# Patient Record
Sex: FEMALE | Race: WHITE | NOT HISPANIC OR LATINO | Employment: OTHER | URBAN - METROPOLITAN AREA
[De-identification: names, ages, dates, MRNs, and addresses within clinical notes are randomized per-mention and may not be internally consistent; named-entity substitution may affect disease eponyms.]

---

## 2024-02-07 PROBLEM — I10 PRIMARY HYPERTENSION: Status: ACTIVE | Noted: 2024-02-07

## 2024-02-07 PROBLEM — E11.9 INSULIN DEPENDENT TYPE 2 DIABETES MELLITUS (MULTI): Status: ACTIVE | Noted: 2024-02-07

## 2024-02-07 PROBLEM — Z79.4 INSULIN DEPENDENT TYPE 2 DIABETES MELLITUS (MULTI): Status: ACTIVE | Noted: 2024-02-07

## 2024-02-07 PROBLEM — E55.9 VITAMIN D DEFICIENCY: Status: ACTIVE | Noted: 2024-02-07

## 2024-02-07 RX ORDER — FLUTICASONE PROPIONATE 50 MCG
1 SPRAY, SUSPENSION (ML) NASAL 2 TIMES DAILY
COMMUNITY
Start: 2023-02-09

## 2024-02-07 RX ORDER — AMLODIPINE BESYLATE 10 MG/1
10 TABLET ORAL DAILY
COMMUNITY
Start: 2023-12-13

## 2024-02-07 RX ORDER — BLOOD-GLUCOSE,RECEIVER,CONT
1 EACH MISCELLANEOUS AS NEEDED
COMMUNITY
Start: 2023-10-03 | End: 2024-02-16 | Stop reason: ALTCHOICE

## 2024-02-07 RX ORDER — METOPROLOL TARTRATE 25 MG/1
25 TABLET, FILM COATED ORAL 2 TIMES DAILY
COMMUNITY
Start: 2024-01-04 | End: 2024-02-16 | Stop reason: SDUPTHER

## 2024-02-07 RX ORDER — ASPIRIN 325 MG
50000 TABLET, DELAYED RELEASE (ENTERIC COATED) ORAL
COMMUNITY
Start: 2023-10-03

## 2024-02-07 RX ORDER — BLOOD-GLUCOSE SENSOR
1 EACH MISCELLANEOUS SEE ADMIN INSTRUCTIONS
COMMUNITY
Start: 2023-10-03 | End: 2024-02-16 | Stop reason: ALTCHOICE

## 2024-02-07 RX ORDER — PROMETHAZINE HYDROCHLORIDE AND DEXTROMETHORPHAN HYDROBROMIDE 6.25; 15 MG/5ML; MG/5ML
5 SYRUP ORAL 4 TIMES DAILY PRN
COMMUNITY
Start: 2023-02-10 | End: 2024-02-16 | Stop reason: ALTCHOICE

## 2024-02-07 RX ORDER — GABAPENTIN 600 MG/1
600 TABLET ORAL 3 TIMES DAILY
COMMUNITY
Start: 2023-12-29

## 2024-02-07 RX ORDER — FUROSEMIDE 20 MG/1
20 TABLET ORAL DAILY
COMMUNITY
Start: 2023-04-19 | End: 2024-02-16 | Stop reason: ALTCHOICE

## 2024-02-07 RX ORDER — MELOXICAM 15 MG/1
15 TABLET ORAL DAILY
COMMUNITY
Start: 2023-12-13

## 2024-02-07 RX ORDER — FEXOFENADINE HYDROCHLORIDE 180 MG/1
180 TABLET, FILM COATED ORAL DAILY
COMMUNITY
Start: 2023-04-19 | End: 2024-02-16 | Stop reason: ALTCHOICE

## 2024-02-07 RX ORDER — INSULIN GLARGINE 100 [IU]/ML
55 INJECTION, SOLUTION SUBCUTANEOUS DAILY
COMMUNITY
Start: 2023-12-08 | End: 2024-02-16 | Stop reason: SDUPTHER

## 2024-02-07 RX ORDER — SEMAGLUTIDE 0.68 MG/ML
0.5 INJECTION, SOLUTION SUBCUTANEOUS
COMMUNITY
Start: 2023-07-06 | End: 2024-02-16 | Stop reason: ALTCHOICE

## 2024-02-07 RX ORDER — FOLIC ACID 1 MG/1
1 TABLET ORAL DAILY
COMMUNITY
Start: 2023-10-03 | End: 2024-02-16 | Stop reason: ALTCHOICE

## 2024-02-07 RX ORDER — BLOOD-GLUCOSE TRANSMITTER
1 EACH MISCELLANEOUS
COMMUNITY
Start: 2023-12-14 | End: 2024-02-16 | Stop reason: ALTCHOICE

## 2024-02-08 ENCOUNTER — APPOINTMENT (OUTPATIENT)
Dept: PRIMARY CARE | Facility: CLINIC | Age: 59
End: 2024-02-08
Payer: COMMERCIAL

## 2024-02-16 ENCOUNTER — OFFICE VISIT (OUTPATIENT)
Dept: PRIMARY CARE | Facility: CLINIC | Age: 59
End: 2024-02-16
Payer: COMMERCIAL

## 2024-02-16 VITALS
RESPIRATION RATE: 16 BRPM | WEIGHT: 269 LBS | HEART RATE: 69 BPM | BODY MASS INDEX: 43.23 KG/M2 | HEIGHT: 66 IN | DIASTOLIC BLOOD PRESSURE: 62 MMHG | SYSTOLIC BLOOD PRESSURE: 110 MMHG | TEMPERATURE: 97.3 F | OXYGEN SATURATION: 98 %

## 2024-02-16 DIAGNOSIS — I25.10 CORONARY ARTERY DISEASE INVOLVING NATIVE CORONARY ARTERY OF NATIVE HEART WITHOUT ANGINA PECTORIS: ICD-10-CM

## 2024-02-16 DIAGNOSIS — E11.9 INSULIN DEPENDENT TYPE 2 DIABETES MELLITUS (MULTI): ICD-10-CM

## 2024-02-16 DIAGNOSIS — Z79.4 INSULIN DEPENDENT TYPE 2 DIABETES MELLITUS (MULTI): ICD-10-CM

## 2024-02-16 DIAGNOSIS — I10 PRIMARY HYPERTENSION: ICD-10-CM

## 2024-02-16 DIAGNOSIS — E66.01 OBESITY, MORBID (MULTI): Primary | ICD-10-CM

## 2024-02-16 LAB — POC HEMOGLOBIN A1C: 9.9 % (ref 4.2–6.5)

## 2024-02-16 PROCEDURE — 99204 OFFICE O/P NEW MOD 45 MIN: CPT | Performed by: FAMILY MEDICINE

## 2024-02-16 PROCEDURE — 3078F DIAST BP <80 MM HG: CPT | Performed by: FAMILY MEDICINE

## 2024-02-16 PROCEDURE — 83036 HEMOGLOBIN GLYCOSYLATED A1C: CPT | Mod: MUE | Performed by: FAMILY MEDICINE

## 2024-02-16 PROCEDURE — 99214 OFFICE O/P EST MOD 30 MIN: CPT | Performed by: FAMILY MEDICINE

## 2024-02-16 PROCEDURE — 1036F TOBACCO NON-USER: CPT | Performed by: FAMILY MEDICINE

## 2024-02-16 PROCEDURE — 3074F SYST BP LT 130 MM HG: CPT | Performed by: FAMILY MEDICINE

## 2024-02-16 RX ORDER — ROSUVASTATIN CALCIUM 20 MG/1
20 TABLET, COATED ORAL DAILY
Qty: 90 TABLET | Refills: 3 | Status: SHIPPED | OUTPATIENT
Start: 2024-02-16 | End: 2025-02-10

## 2024-02-16 ASSESSMENT — PATIENT HEALTH QUESTIONNAIRE - PHQ9
SUM OF ALL RESPONSES TO PHQ9 QUESTIONS 1 AND 2: 0
2. FEELING DOWN, DEPRESSED OR HOPELESS: NOT AT ALL
1. LITTLE INTEREST OR PLEASURE IN DOING THINGS: NOT AT ALL

## 2024-02-16 ASSESSMENT — ENCOUNTER SYMPTOMS
OCCASIONAL FEELINGS OF UNSTEADINESS: 1
CONSTITUTIONAL NEGATIVE: 1
GASTROINTESTINAL NEGATIVE: 1
MUSCULOSKELETAL NEGATIVE: 1
NEUROLOGICAL NEGATIVE: 1
CARDIOVASCULAR NEGATIVE: 1
RESPIRATORY NEGATIVE: 1
LOSS OF SENSATION IN FEET: 0
DEPRESSION: 0

## 2024-02-16 ASSESSMENT — COLUMBIA-SUICIDE SEVERITY RATING SCALE - C-SSRS
2. HAVE YOU ACTUALLY HAD ANY THOUGHTS OF KILLING YOURSELF?: NO
1. IN THE PAST MONTH, HAVE YOU WISHED YOU WERE DEAD OR WISHED YOU COULD GO TO SLEEP AND NOT WAKE UP?: NO
6. HAVE YOU EVER DONE ANYTHING, STARTED TO DO ANYTHING, OR PREPARED TO DO ANYTHING TO END YOUR LIFE?: NO

## 2024-02-16 ASSESSMENT — PAIN SCALES - GENERAL: PAINLEVEL: 0-NO PAIN

## 2024-02-16 NOTE — PROGRESS NOTES
"Subjective   Patient ID: Dee Collins is a 58 y.o. female who presents for New Patient Visit (Pt is here to establish care and for medication refills. Pt is diabetic and has not had a eye exam for 3 years. ).    HPI no eye exam in 3 years.  She took trulicity in the past but had significant side effects with palpitations.    She had stent placement about 7 years ago and last saw cardiology was at the time of the stent placement.   Review of Systems   Constitutional: Negative.    HENT: Negative.     Respiratory: Negative.     Cardiovascular: Negative.    Gastrointestinal: Negative.    Genitourinary: Negative.    Musculoskeletal: Negative.    Neurological: Negative.        Objective   /62 (BP Location: Left arm, Patient Position: Sitting, BP Cuff Size: Adult long)   Pulse 69   Temp 36.3 °C (97.3 °F) (Temporal)   Resp 16   Ht 1.676 m (5' 6\")   Wt 122 kg (269 lb)   SpO2 98%   BMI 43.42 kg/m²     Physical Exam  Constitutional:       General: She is not in acute distress.     Appearance: Normal appearance.   Cardiovascular:      Rate and Rhythm: Normal rate and regular rhythm.      Heart sounds: No murmur heard.  Pulmonary:      Breath sounds: Normal breath sounds. No wheezing.   Neurological:      Mental Status: She is alert.         Assessment/Plan   Problem List Items Addressed This Visit             ICD-10-CM    Insulin dependent type 2 diabetes mellitus (CMS/HCC) E11.9, Z79.4    Relevant Orders    POCT glycosylated hemoglobin (Hb A1C) manually resulted (Completed)    Albumin, urine, random    Referral to Ophthalmology    CBC and Auto Differential    Comprehensive Metabolic Panel    TSH with reflex to Free T4 if abnormal    Lipid Panel    Referral to Cardiology    Referral to Ophthalmology    Obesity, morbid (CMS/HCC) - Primary E66.01    Relevant Orders    CBC and Auto Differential    Comprehensive Metabolic Panel    TSH with reflex to Free T4 if abnormal    Lipid Panel     Other Visit Diagnoses         " Codes    Coronary artery disease involving native coronary artery of native heart without angina pectoris     I25.10    Relevant Medications    rosuvastatin (Crestor) 20 mg tablet          Will re establish meds and check labs.  Recheck 3 months.

## 2024-02-19 RX ORDER — INSULIN GLARGINE 100 [IU]/ML
55 INJECTION, SOLUTION SUBCUTANEOUS DAILY
Qty: 3 ML | Refills: 11 | Status: SHIPPED | OUTPATIENT
Start: 2024-02-19 | End: 2024-03-04 | Stop reason: SDUPTHER

## 2024-02-19 RX ORDER — METOPROLOL TARTRATE 25 MG/1
25 TABLET, FILM COATED ORAL 2 TIMES DAILY
Qty: 90 TABLET | Refills: 3 | Status: SHIPPED | OUTPATIENT
Start: 2024-02-19

## 2024-03-04 ENCOUNTER — CLINICAL SUPPORT (OUTPATIENT)
Dept: PRIMARY CARE | Facility: CLINIC | Age: 59
End: 2024-03-04
Payer: COMMERCIAL

## 2024-03-04 DIAGNOSIS — E11.9 INSULIN DEPENDENT TYPE 2 DIABETES MELLITUS (MULTI): ICD-10-CM

## 2024-03-04 DIAGNOSIS — E66.01 OBESITY, MORBID (MULTI): ICD-10-CM

## 2024-03-04 DIAGNOSIS — Z79.4 INSULIN DEPENDENT TYPE 2 DIABETES MELLITUS (MULTI): ICD-10-CM

## 2024-03-04 LAB
ALBUMIN SERPL-MCNC: 4 G/DL (ref 3.5–5)
ALP BLD-CCNC: 91 U/L (ref 35–125)
ALT SERPL-CCNC: 13 U/L (ref 5–40)
ANION GAP SERPL CALC-SCNC: 11 MMOL/L
AST SERPL-CCNC: 10 U/L (ref 5–40)
BASOPHILS # BLD AUTO: 0.04 X10*3/UL (ref 0–0.1)
BASOPHILS NFR BLD AUTO: 0.6 %
BILIRUB SERPL-MCNC: 0.4 MG/DL (ref 0.1–1.2)
BUN SERPL-MCNC: 14 MG/DL (ref 8–25)
CALCIUM SERPL-MCNC: 9.4 MG/DL (ref 8.5–10.4)
CHLORIDE SERPL-SCNC: 100 MMOL/L (ref 97–107)
CHOLEST SERPL-MCNC: 146 MG/DL (ref 133–200)
CHOLEST/HDLC SERPL: 3.4 {RATIO}
CO2 SERPL-SCNC: 24 MMOL/L (ref 24–31)
CREAT SERPL-MCNC: 0.7 MG/DL (ref 0.4–1.6)
EGFRCR SERPLBLD CKD-EPI 2021: >90 ML/MIN/1.73M*2
EOSINOPHIL # BLD AUTO: 0.18 X10*3/UL (ref 0–0.7)
EOSINOPHIL NFR BLD AUTO: 2.6 %
ERYTHROCYTE [DISTWIDTH] IN BLOOD BY AUTOMATED COUNT: 12.5 % (ref 11.5–14.5)
GLUCOSE SERPL-MCNC: 357 MG/DL (ref 65–99)
HCT VFR BLD AUTO: 40.7 % (ref 36–46)
HDLC SERPL-MCNC: 43 MG/DL
HGB BLD-MCNC: 13.4 G/DL (ref 12–16)
IMM GRANULOCYTES # BLD AUTO: 0.02 X10*3/UL (ref 0–0.7)
IMM GRANULOCYTES NFR BLD AUTO: 0.3 % (ref 0–0.9)
LDLC SERPL CALC-MCNC: 79 MG/DL (ref 65–130)
LYMPHOCYTES # BLD AUTO: 1.68 X10*3/UL (ref 1.2–4.8)
LYMPHOCYTES NFR BLD AUTO: 24.3 %
MCH RBC QN AUTO: 27.9 PG (ref 26–34)
MCHC RBC AUTO-ENTMCNC: 32.9 G/DL (ref 32–36)
MCV RBC AUTO: 85 FL (ref 80–100)
MONOCYTES # BLD AUTO: 0.54 X10*3/UL (ref 0.1–1)
MONOCYTES NFR BLD AUTO: 7.8 %
NEUTROPHILS # BLD AUTO: 4.45 X10*3/UL (ref 1.2–7.7)
NEUTROPHILS NFR BLD AUTO: 64.4 %
NRBC BLD-RTO: 0 /100 WBCS (ref 0–0)
PLATELET # BLD AUTO: 250 X10*3/UL (ref 150–450)
POTASSIUM SERPL-SCNC: 5.1 MMOL/L (ref 3.4–5.1)
PROT SERPL-MCNC: 6.5 G/DL (ref 5.9–7.9)
RBC # BLD AUTO: 4.81 X10*6/UL (ref 4–5.2)
SODIUM SERPL-SCNC: 135 MMOL/L (ref 133–145)
TRIGL SERPL-MCNC: 119 MG/DL (ref 40–150)
TSH SERPL DL<=0.05 MIU/L-ACNC: 2.12 MIU/L (ref 0.27–4.2)
WBC # BLD AUTO: 6.9 X10*3/UL (ref 4.4–11.3)

## 2024-03-04 PROCEDURE — 80053 COMPREHEN METABOLIC PANEL: CPT

## 2024-03-04 PROCEDURE — 83718 ASSAY OF LIPOPROTEIN: CPT

## 2024-03-04 PROCEDURE — 36415 COLL VENOUS BLD VENIPUNCTURE: CPT

## 2024-03-04 PROCEDURE — 84443 ASSAY THYROID STIM HORMONE: CPT

## 2024-03-04 PROCEDURE — 85025 COMPLETE CBC W/AUTO DIFF WBC: CPT

## 2024-03-04 RX ORDER — INSULIN GLARGINE 100 [IU]/ML
55 INJECTION, SOLUTION SUBCUTANEOUS DAILY
Qty: 33 ML | Refills: 1 | Status: SHIPPED | OUTPATIENT
Start: 2024-03-04

## 2024-09-24 DIAGNOSIS — I10 PRIMARY HYPERTENSION: ICD-10-CM

## 2024-09-24 RX ORDER — METOPROLOL TARTRATE 25 MG/1
25 TABLET, FILM COATED ORAL 2 TIMES DAILY
Qty: 90 TABLET | Refills: 1 | Status: SHIPPED | OUTPATIENT
Start: 2024-09-24

## 2025-03-06 DIAGNOSIS — Z79.4 INSULIN DEPENDENT TYPE 2 DIABETES MELLITUS (MULTI): ICD-10-CM

## 2025-03-06 DIAGNOSIS — E11.9 INSULIN DEPENDENT TYPE 2 DIABETES MELLITUS (MULTI): ICD-10-CM

## 2025-03-07 RX ORDER — INSULIN GLARGINE 100 [IU]/ML
55 INJECTION, SOLUTION SUBCUTANEOUS DAILY
Qty: 33 ML | Refills: 1 | Status: SHIPPED | OUTPATIENT
Start: 2025-03-07

## 2025-03-07 ASSESSMENT — ENCOUNTER SYMPTOMS
PARESTHESIAS: 1
TINGLING: 1
WEAKNESS: 1
LEG PAIN: 1
BACK PAIN: 1
NUMBNESS: 1

## 2025-03-11 ENCOUNTER — OFFICE VISIT (OUTPATIENT)
Dept: PRIMARY CARE | Facility: CLINIC | Age: 60
End: 2025-03-11
Payer: MEDICARE

## 2025-03-11 VITALS
SYSTOLIC BLOOD PRESSURE: 144 MMHG | RESPIRATION RATE: 20 BRPM | HEART RATE: 80 BPM | BODY MASS INDEX: 42.88 KG/M2 | OXYGEN SATURATION: 98 % | WEIGHT: 266.8 LBS | HEIGHT: 66 IN | TEMPERATURE: 97.1 F | DIASTOLIC BLOOD PRESSURE: 78 MMHG

## 2025-03-11 DIAGNOSIS — M51.9 LUMBAR DISC DISEASE: ICD-10-CM

## 2025-03-11 DIAGNOSIS — E11.9 INSULIN DEPENDENT TYPE 2 DIABETES MELLITUS (MULTI): ICD-10-CM

## 2025-03-11 DIAGNOSIS — Z79.4 INSULIN DEPENDENT TYPE 2 DIABETES MELLITUS (MULTI): ICD-10-CM

## 2025-03-11 DIAGNOSIS — I25.10 CORONARY ARTERY DISEASE INVOLVING NATIVE CORONARY ARTERY OF NATIVE HEART WITHOUT ANGINA PECTORIS: Primary | ICD-10-CM

## 2025-03-11 LAB — POC HEMOGLOBIN A1C: 9.7 % (ref 4.2–6.5)

## 2025-03-11 PROCEDURE — 83036 HEMOGLOBIN GLYCOSYLATED A1C: CPT | Mod: MUE | Performed by: FAMILY MEDICINE

## 2025-03-11 PROCEDURE — 3078F DIAST BP <80 MM HG: CPT | Performed by: FAMILY MEDICINE

## 2025-03-11 PROCEDURE — 99214 OFFICE O/P EST MOD 30 MIN: CPT | Performed by: FAMILY MEDICINE

## 2025-03-11 PROCEDURE — 3077F SYST BP >= 140 MM HG: CPT | Performed by: FAMILY MEDICINE

## 2025-03-11 PROCEDURE — G2211 COMPLEX E/M VISIT ADD ON: HCPCS | Performed by: FAMILY MEDICINE

## 2025-03-11 PROCEDURE — 3008F BODY MASS INDEX DOCD: CPT | Performed by: FAMILY MEDICINE

## 2025-03-11 RX ORDER — GABAPENTIN 300 MG/1
300 CAPSULE ORAL 3 TIMES DAILY
Qty: 90 TABLET | Refills: 2 | Status: SHIPPED | OUTPATIENT
Start: 2025-03-11

## 2025-03-11 ASSESSMENT — ENCOUNTER SYMPTOMS
NUMBNESS: 1
BACK PAIN: 1
TINGLING: 1
WEAKNESS: 1
PARESTHESIAS: 1
LEG PAIN: 1
LOSS OF SENSATION IN FEET: 0
DEPRESSION: 0
OCCASIONAL FEELINGS OF UNSTEADINESS: 0

## 2025-03-11 ASSESSMENT — PATIENT HEALTH QUESTIONNAIRE - PHQ9
2. FEELING DOWN, DEPRESSED OR HOPELESS: NOT AT ALL
SUM OF ALL RESPONSES TO PHQ9 QUESTIONS 1 AND 2: 0
1. LITTLE INTEREST OR PLEASURE IN DOING THINGS: NOT AT ALL

## 2025-03-11 ASSESSMENT — PAIN SCALES - GENERAL: PAINLEVEL_OUTOF10: 6

## 2025-03-11 NOTE — PROGRESS NOTES
Went to speak with patient and she left office without being seen  Referral placed for follow up to restart meal time insulin and discuss CGM use

## 2025-03-11 NOTE — PROGRESS NOTES
"Subjective   Patient ID: Dee Collins is a 59 y.o. female who presents for Diabetes (HERE FOR DIABETIC CHECK, SHE IS ALSO HAVING A LOT OF LOW BACK PAIN. SHE ALSO FELT SOMETHING POP IN HER FOOT AND HER RIGHT FOOT WAS SWOLLEN).    Back Pain  This is a chronic problem. The current episode started more than 1 year ago. The problem occurs constantly. The problem has been gradually worsening since onset. The pain is present in the sacro-iliac. The quality of the pain is described as aching, burning and stabbing. The pain radiates to the right thigh. The pain is at a severity of 7/10. The pain is Worse during the day. The symptoms are aggravated by bending, position, standing and stress. Stiffness is present All day. Associated symptoms include chest pain, leg pain, numbness, paresthesias, tingling and weakness. Risk factors include history of osteoporosis, obesity, recent trauma and sedentary lifestyle.    some ongoing low back pain/disc disease.  Had remote hx of PT without relief.    A1c today is 9.7 down from 9.9 1 year ago was last A1c with us.    Review of Systems   Cardiovascular:  Positive for chest pain.   Musculoskeletal:  Positive for back pain.   Neurological:  Positive for tingling, weakness, numbness and paresthesias.       Objective   /78 (BP Location: Right arm, Patient Position: Sitting, BP Cuff Size: Adult)   Pulse 80   Temp 36.2 °C (97.1 °F) (Temporal)   Resp 20   Ht 1.676 m (5' 6\")   Wt 121 kg (266 lb 12.8 oz)   SpO2 98%   BMI 43.06 kg/m²     Physical Exam  Constitutional:       General: She is not in acute distress.     Appearance: Normal appearance.   Cardiovascular:      Rate and Rhythm: Normal rate and regular rhythm.      Heart sounds: No murmur heard.  Pulmonary:      Breath sounds: Normal breath sounds. No wheezing.   Musculoskeletal:      Comments: Some para lumbar tenderness throughout.    Neurological:      Mental Status: She is alert.         Assessment/Plan   Problem List " Items Addressed This Visit             ICD-10-CM    Insulin dependent type 2 diabetes mellitus (Multi) E11.9, Z79.4    Relevant Orders    POCT glycosylated hemoglobin (Hb A1C) manually resulted (Completed)    Referral to Ophthalmology    Referral to Clinical Pharmacy     Other Visit Diagnoses         Codes    Coronary artery disease involving native coronary artery of native heart without angina pectoris    -  Primary I25.10    Relevant Orders    Referral to Cardiology    Lumbar disc disease     M51.9    Relevant Medications    gabapentin (Neurontin) 300 mg capsule    Other Relevant Orders    Referral to Pain Medicine        Disc with clinical pharm will add mealtime insulin and recheck 3 months.  Work on good diet /wt loss.

## 2025-04-10 ENCOUNTER — PATIENT MESSAGE (OUTPATIENT)
Dept: PRIMARY CARE | Facility: CLINIC | Age: 60
End: 2025-04-10
Payer: MEDICARE

## 2025-04-10 DIAGNOSIS — E11.9 INSULIN DEPENDENT TYPE 2 DIABETES MELLITUS (MULTI): Primary | ICD-10-CM

## 2025-04-10 DIAGNOSIS — Z79.4 INSULIN DEPENDENT TYPE 2 DIABETES MELLITUS (MULTI): Primary | ICD-10-CM

## 2025-04-10 RX ORDER — INSULIN GLARGINE 100 [IU]/ML
INJECTION, SOLUTION SUBCUTANEOUS
Qty: 30 ML | Refills: 3 | Status: SHIPPED | OUTPATIENT
Start: 2025-04-10

## 2025-04-15 ENCOUNTER — APPOINTMENT (OUTPATIENT)
Dept: CARDIOLOGY | Facility: CLINIC | Age: 60
End: 2025-04-15
Payer: MEDICARE

## 2025-04-16 ENCOUNTER — APPOINTMENT (OUTPATIENT)
Dept: ORTHOPEDIC SURGERY | Facility: CLINIC | Age: 60
End: 2025-04-16
Payer: MEDICARE

## 2025-04-16 DIAGNOSIS — M54.2 NECK PAIN: Primary | ICD-10-CM

## 2025-04-16 DIAGNOSIS — M54.9 BACK PAIN WITHOUT SCIATICA: ICD-10-CM

## 2025-04-28 ENCOUNTER — APPOINTMENT (OUTPATIENT)
Dept: ORTHOPEDIC SURGERY | Facility: CLINIC | Age: 60
End: 2025-04-28
Payer: MEDICARE

## 2025-06-10 ENCOUNTER — APPOINTMENT (OUTPATIENT)
Dept: CARDIOLOGY | Facility: CLINIC | Age: 60
End: 2025-06-10
Payer: MEDICARE